# Patient Record
Sex: FEMALE
[De-identification: names, ages, dates, MRNs, and addresses within clinical notes are randomized per-mention and may not be internally consistent; named-entity substitution may affect disease eponyms.]

---

## 2019-03-23 ENCOUNTER — HOSPITAL ENCOUNTER (OUTPATIENT)
Dept: HOSPITAL 5 - TRG | Age: 29
Discharge: HOME | End: 2019-03-23
Attending: OBSTETRICS & GYNECOLOGY
Payer: MEDICAID

## 2019-03-23 VITALS — SYSTOLIC BLOOD PRESSURE: 106 MMHG | DIASTOLIC BLOOD PRESSURE: 61 MMHG

## 2019-03-23 DIAGNOSIS — O99.513: ICD-10-CM

## 2019-03-23 DIAGNOSIS — O47.03: Primary | ICD-10-CM

## 2019-03-23 DIAGNOSIS — Z3A.34: ICD-10-CM

## 2019-03-23 LAB
BACTERIA #/AREA URNS HPF: (no result) /HPF
BILIRUB UR QL STRIP: (no result)
BLOOD UR QL VISUAL: (no result)
MUCOUS THREADS #/AREA URNS HPF: (no result) /HPF
PH UR STRIP: 6 [PH] (ref 5–7)
RBC #/AREA URNS HPF: 2 /HPF (ref 0–6)
UROBILINOGEN UR-MCNC: 2 MG/DL (ref ?–2)
WBC #/AREA URNS HPF: 3 /HPF (ref 0–6)

## 2019-03-23 PROCEDURE — 76815 OB US LIMITED FETUS(S): CPT

## 2019-03-23 PROCEDURE — 81001 URINALYSIS AUTO W/SCOPE: CPT

## 2019-03-23 PROCEDURE — 59025 FETAL NON-STRESS TEST: CPT

## 2019-03-23 NOTE — PROGRESS NOTE
Assessment and Plan


A: Pregnancy at 34 weeks, 3 days gestation.


Not in labor. 


Normal MONIQUE.


Low back pain resolved. 


P: Discussed test results with patient. Discussed signs of labor, daily fetal 

movement counting, warning signs of late pregnancy, comfort measures discussed 

with patient. Rx Augmentin to [facility] on [address]. Advised 

patient to follow up at [facility] this week. Patient voiced 

understanding of instructions. 








Subjective





- Subjective


Date of service: 19


Principal diagnosis: Pregnancy at 34 weeks, 3 days gestation; low back pain


Interval history: 


28 year old  presents to L&D triage at 34 weeks, 3 days gestation 

complaining of lower back discomfort. 


Patient denies falls or trauma; she denies flank pain, fever, chills, malaise, 

or urinary symptoms. 


Patient denies contractions, vaginal bleeding, leaking of fluid. Patient reports

active fetal movement. 








Patient reports: fetal movement normal, no loss of fluid, no vaginal bleeding, 

no contractions





Objective





- Vital Signs


Vital Signs: 


                               Vital Signs - 12hr











  19





  14:49


 


Pulse Rate 93 H


 


Blood Pressure 106/61














- Exam


Abdomen: Present: normal appearance, soft.  Absent: distention, tenderness, 

guarding, rigidity


Uterus: Present: normal, fundal height above umbilicus


FHR: category 1


Uterine Contraction Monitor Mode: External


Uterine Contraction Pattern: Absent


Extremities: normal





- Labs


Labs: 


                                  Abnormal Labs











  19





  15:15


 


U Epithel Cells (Auto)  57.0 H








                         Laboratory Results - last 24 hr











  19





  15:15


 


Urine Color  Ally


 


Urine Turbidity  Cloudy


 


Urine pH  6.0


 


Ur Specific Gravity  1.024


 


Urine Protein  30 mg/dl


 


Urine Glucose (UA)  Neg


 


Urine Ketones  Neg


 


Urine Blood  Neg


 


Urine Nitrite  Neg


 


Urine Bilirubin  Neg


 


Urine Urobilinogen  2.0


 


Ur Leukocyte Esterase  Sm


 


Urine WBC (Auto)  3.0


 


Urine RBC (Auto)  2.0


 


U Epithel Cells (Auto)  57.0 H


 


Urine Bacteria (Auto)  2+


 


Urine Mucus  Few

## 2019-03-23 NOTE — ULTRASOUND REPORT
PROCEDURE: US OB LIMITED 

 

TECHNIQUE:  Transabdominal abdominal sonographic imaging was obtained of the abdomen for amniotic flu
id index. 

 

HISTORY: Amniotic fluid index. 

 

COMPARISONS: Ultrasound dated December 19, 2018. 

 

FINDINGS: 

Single, viable intrauterine pregnancy is present. 

Cephalic presentation. 

Amniotic fluid index estimated at 15.3 cm (within normal range; 7-24 cm). 

Fetal heart rate: 141 bpm. 

Estimated age 34 week 3 day. 

Estimated date of delivery: 5/1/2019. 

 

IMPRESSION:  

Limited examination to evaluate amniotic fluid index which is within normal limits, estimated at 15.3
 cm (normal range 7-24 cm). 

 

Single, viable intrauterine pregnancy with a fetal heart rate of 1 41 bpm. 

 

This document is electronically signed by Donn Bull DO., March 23 2019 06:11:14 PM ET

## 2019-04-25 ENCOUNTER — HOSPITAL ENCOUNTER (INPATIENT)
Dept: HOSPITAL 5 - APU | Age: 29
LOS: 3 days | Discharge: HOME | End: 2019-04-28
Attending: OBSTETRICS & GYNECOLOGY | Admitting: OBSTETRICS & GYNECOLOGY
Payer: MEDICAID

## 2019-04-25 DIAGNOSIS — Z79.899: ICD-10-CM

## 2019-04-25 DIAGNOSIS — D50.0: ICD-10-CM

## 2019-04-25 DIAGNOSIS — O34.211: Primary | ICD-10-CM

## 2019-04-25 DIAGNOSIS — E66.9: ICD-10-CM

## 2019-04-25 DIAGNOSIS — F32.9: ICD-10-CM

## 2019-04-25 DIAGNOSIS — Z3A.39: ICD-10-CM

## 2019-04-25 DIAGNOSIS — J45.909: ICD-10-CM

## 2019-04-25 DIAGNOSIS — D62: ICD-10-CM

## 2019-04-25 DIAGNOSIS — D64.9: ICD-10-CM

## 2019-04-25 LAB
BASOPHILS # (AUTO): 0.1 K/MM3 (ref 0–0.1)
BASOPHILS NFR BLD AUTO: 1.2 % (ref 0–1.8)
EOSINOPHIL # BLD AUTO: 0 K/MM3 (ref 0–0.4)
EOSINOPHIL NFR BLD AUTO: 0.5 % (ref 0–4.3)
HCT VFR BLD CALC: 29.2 % (ref 30.3–42.9)
HGB BLD-MCNC: 9.7 GM/DL (ref 10.1–14.3)
LYMPHOCYTES # BLD AUTO: 1.5 K/MM3 (ref 1.2–5.4)
LYMPHOCYTES NFR BLD AUTO: 21.4 % (ref 13.4–35)
MCHC RBC AUTO-ENTMCNC: 33 % (ref 30–34)
MCV RBC AUTO: 73 FL (ref 79–97)
MONOCYTES # (AUTO): 0.5 K/MM3 (ref 0–0.8)
MONOCYTES % (AUTO): 6.8 % (ref 0–7.3)
PLATELET # BLD: 202 K/MM3 (ref 140–440)
RBC # BLD AUTO: 4.02 M/MM3 (ref 3.65–5.03)

## 2019-04-25 PROCEDURE — 86592 SYPHILIS TEST NON-TREP QUAL: CPT

## 2019-04-25 PROCEDURE — 86901 BLOOD TYPING SEROLOGIC RH(D): CPT

## 2019-04-25 PROCEDURE — 90471 IMMUNIZATION ADMIN: CPT

## 2019-04-25 PROCEDURE — 86762 RUBELLA ANTIBODY: CPT

## 2019-04-25 PROCEDURE — A6250 SKIN SEAL PROTECT MOISTURIZR: HCPCS

## 2019-04-25 PROCEDURE — 86900 BLOOD TYPING SEROLOGIC ABO: CPT

## 2019-04-25 PROCEDURE — 85018 HEMOGLOBIN: CPT

## 2019-04-25 PROCEDURE — 85014 HEMATOCRIT: CPT

## 2019-04-25 PROCEDURE — 90715 TDAP VACCINE 7 YRS/> IM: CPT

## 2019-04-25 PROCEDURE — 85025 COMPLETE CBC W/AUTO DIFF WBC: CPT

## 2019-04-25 PROCEDURE — 87806 HIV AG W/HIV1&2 ANTB W/OPTIC: CPT

## 2019-04-25 PROCEDURE — 36415 COLL VENOUS BLD VENIPUNCTURE: CPT

## 2019-04-25 PROCEDURE — 86850 RBC ANTIBODY SCREEN: CPT

## 2019-04-25 PROCEDURE — 86803 HEPATITIS C AB TEST: CPT

## 2019-04-25 PROCEDURE — 86706 HEP B SURFACE ANTIBODY: CPT

## 2019-04-25 RX ADMIN — OXYCODONE AND ACETAMINOPHEN PRN TAB: 5; 325 TABLET ORAL at 19:23

## 2019-04-25 RX ADMIN — HYDROMORPHONE HYDROCHLORIDE PRN MG: 1 INJECTION, SOLUTION INTRAMUSCULAR; INTRAVENOUS; SUBCUTANEOUS at 14:19

## 2019-04-25 RX ADMIN — HYDROMORPHONE HYDROCHLORIDE PRN MG: 1 INJECTION, SOLUTION INTRAMUSCULAR; INTRAVENOUS; SUBCUTANEOUS at 14:45

## 2019-04-25 RX ADMIN — KETOROLAC TROMETHAMINE PRN MG: 30 INJECTION, SOLUTION INTRAMUSCULAR at 15:07

## 2019-04-25 RX ADMIN — HYDROMORPHONE HYDROCHLORIDE PRN MG: 1 INJECTION, SOLUTION INTRAMUSCULAR; INTRAVENOUS; SUBCUTANEOUS at 13:58

## 2019-04-25 RX ADMIN — HYDROMORPHONE HYDROCHLORIDE PRN MG: 1 INJECTION, SOLUTION INTRAMUSCULAR; INTRAVENOUS; SUBCUTANEOUS at 14:50

## 2019-04-25 NOTE — OPERATIVE REPORT
Operative Report


Operative Report: 


Preoperative diagnosis


1. SIUP at 39 weeks and 1 day gestation not in labor.


2. Previous C/section. 





Postoperative diagnosis: Same.





Procedure: Repeat low-transverse  section.





Surgeon: Dr. Perez





Assistant: none





Anesthesia: epidural.





IVF: RL 1500 cc 





EBL: 800 cc





Urine: 200 cc clear





Complications: none.





Intraoperative findings:


1.  A male infant found in an RL position, nuchal cord x 1, delivered at 12:36 

PM, Apgars 8 at 1 minute and 9 at 5 minutes, weight 7 lbs. 4 oz.


2.  Normal fallopian tubes and ovaries bilaterally.





Procedure details:


Risks, benefits, and alternatives of the procedure were discussed in detail with

the patient which included but not limited to the risk of infection, hemorrhage 

requiring blood transfusion, injury to the bowel or bladder and blood vessels.  

The patient expressed understanding, her questions were answered, and she gave 

informed consent.





The patient was taken to the operating room with an IV fluid infusing Ringers 

lactate.  In the operating room, she was placed in a sitting position and given 

spinal anesthesia. She was then placed in a dorsal supine position with a 

leftward tilt.  Muniz catheter in Venodyne boots were placed.  The abdomen was 

washed and she was prepared and draped in usual sterile fashion.  After 

confirming adequate anesthesia, the Pfannenstiel skin incision was made in the 

lower abdomen about 2 cm above the pubic symphysis using the scalpel.  This 

incision was carried down to the underlying fascia using the Bovie.  The fascia 

was opened bilaterally in a curvilinear fashion using the Bovie.  2 straight 

Kocker clamps were used to grasp the upper edge of the fascia from which the 

underlying rectus abdominis muscles was dissected off using the Bovie.  A 

similar procedure was done with the lower edge of the fascia to dissect the 

underlying rectus abdominis muscle.  The muscle was  bluntly from the 

midline by pulling.  The parietal peritoneum was grasped with 2 hemostat clamps 

and entered sharply using Metzenbaum scissors.  A quick survey of the anatomy 

revealed a gravid uterus, normal fallopian tubes and ovaries bilaterally.  A 

bladder flap was created.  Doni'O retractor was placed in the incision for 

proper visualization.  A low transverse incision was made in the lower uterine 

segment using the scalpel and extended bilaterally in a curvilinear fashion 

using bandage scissors.  There was copious amount of clear amniotic fluids.  The

infant was found in an RL position, the head was delivered atraumatically 

followed by the delivery of the shoulders and the rest of the body at 12:36 PM. 

There was nuchal cord x 1. The cord was clamped 2 and cut and the infant was 

handed off to the waiting pediatrician.  The infant was a male, Apgars were 8 at

1 minute and 9 at 5 minutes, weight was 7 pounds and 4 ounces.  Cord blood was 

collected.  The placenta was delivered manually and it was complete with a 

three-vessel cord.  The uterine cavity was cleaned of clots and debris using dry

lap sponges.  The uterine incision was repaired in a running locked fashion 

using 0 Vicryl sutures.  A second layer of imbrication was placed. The gutters 

were cleaned of clots and debris using dry lap sponges.  After confirming 

adequate hemostasis, the instruments were removed from the abdominal cavity.  Th

e rectus muscle was reapproximated in an interrupted fashion using 0 Vicryl 

sutures.  The fascia was closed in a running fashion using 0 Vicryl sutures. The

subcutaneous adipose layer was closed with 2.0 chromic sutures.   The skin was 

closed with staples.  Sterile dressing was placed.





The counts of laps, needles, sponges, and instruments were correct 2.  The 

patient tolerated the procedure well, she was taken to the recovery room in a 

stable condition.

## 2019-04-25 NOTE — HISTORY AND PHYSICAL REPORT
History of Present Illness


Date of examination: 19


Date of admission: 


19 09:09





Chief complaint: 





SIUP at 39 weeks and 1 day gestation not in labor.


Previous C/section.


History of present illness: 





Patient is a 29 year old , LMP 07/15/18, EDC 19 at 39 weeks and 1 

day gestation who was admitted for elective repeat C/section. She denies any con

tractions, fluid leakage or bleeding. She reports good fetal movement. fetal 

tracing CAT1.





Past History


Past Medical History: asthma, other (vit D def, Arnold Chiari malformation.)


Past Surgical History:  section (Neck surgery (vertebral))


GYN History: chlamydia, herpes


Family/Genetic History: none


Social history: no significant social history





- Obstetrical History


Expected Date of Delivery: 19


Actual Gestation: 39 Week(s) 1 Day(s) 


: 7


Para: 6


Number of Living Children: 6





Medications and Allergies


                                    Allergies











Allergy/AdvReac Type Severity Reaction Status Date / Time


 


nickel Allergy  Rash Verified 18 12:44











                                Home Medications











 Medication  Instructions  Recorded  Confirmed  Last Taken  Type


 


Pnv95/Ferrous Fumarate/FA 1 each PO DAILY 10/14/13 09/04/16 06/28/15 History





[Prenatal Multivitamins Tablet]     


 


Ferrous Sulfate [Feosol 325 MG tab] 325 mg PO BID #60 tablet 16  Unknown 

Rx


 


HYDROcodone/APAP 5-325 [[place] 1 each PO Q6HR PRN #30 tablet 16  Unknown Rx





5/325]     


 


Ibuprofen [Motrin] 800 mg PO Q8HR PRN #30 tablet 16  Unknown Rx


 


Prenatal Vit Calc,Iron,Folic 1 each PO DAILY 30 Days  tablet 16  Unknown 

Rx





[Prenatal Vitamins]     


 


Sertraline [Zoloft] 50 mg PO QDAY 16 Unknown History














- Vital Signs


Vital signs: 


                                   Vital Signs











Temp Resp


 


 97.9 F   18 


 


 19 09:37  19 09:37








                                        











Temp Pulse Resp BP Pulse Ox


 


 97.9 F      18       


 


 19 09:37     19 09:37      














- Physical Exam


Cardiovascular: Normal S1, Normal S2


Lungs: Positive: Clear to auscultation


Vulva: both: normal


Adnexa: both: normal


Deep Tendon Reflex Grade: Normal +2





- Obstetrical


FHR: category 1


Cervical Dilatation: 0


Cervical Effacement Percentage: 0


Fetal station: -3


Uterine Contraction Pattern: Absent





Results


Result Diagrams: 


                                 19 09:56





All other labs normal.








Assessment and Plan





- Patient Problems


(1) 39 weeks gestation of pregnancy


Current Visit: Yes   Status: Acute   





(2) Previous  section


Current Visit: Yes   Status: Acute   


Plan to address problem: 


Admit to labor floor.


Routine preop labs.


IV hydration.


Keep NPO.


Fetal monitoring.


Patient was counselled for repeat C/section.


Risks, benefits, and alternatives of the procedure were discussed in detail with

 the patient which included but not limited to the risk of infection, hemorrhage

 requiring blood transfusion, injury to the bowel or bladder and blood vessels. 

 The patient expressed understanding, her questions were answered, and she gave 

informed consent.


Anesthesia notified.








(3) Anemia


Current Visit: Yes   Status: Acute   


Qualifiers: 


   Anemia type: iron deficiency 





(4) Obesity


Current Visit: Yes   Status: Acute   





(5) Grand multipara


Current Visit: Yes   Status: Acute

## 2019-04-25 NOTE — ANESTHESIA DAY OF SURGERY
Anesthesia Day of Surgery





- Day of Surgery


Patient Examined: Yes


Patient H&P Reviewed: Yes


Patient is NPO: Yes


Beta Blockers: No


Cardiac Clearance: No


Pulmonary Clearance: No


Dany's Test: N/A

## 2019-04-25 NOTE — ANESTHESIA CONSULTATION
Anesthesia Consult and Med Hx





- Airway


Anesthetic Teeth Evaluation: Poor (crowding)


ROM Head & Neck: Adequate


Mental/Hyoid Distance: Adequate


Mallampati Class: Class II


Intubation Access Assessment: Probably Good





- Pulmonary Exam


CTA: Yes





- Cardiac Exam


Cardiac Exam: RRR





- Pre-Operative Health Status


ASA Pre-Surgery Classification: ASA2


Proposed Anesthetic Plan: Epidural, Spinal





- Pulmonary


Hx Smoking: No


Hx Asthma: Yes (diagnosed when pt was child)


COPD: No


Hx Pneumonia: No





- Cardiovascular System


Hx Hypertension: No


Hx Heart Attack/AMI: No





- Central Nervous System


Hx Seizures: No


Hx Psychiatric Problems: Yes (depression)





- Endocrine


Hx Renal Disease: No


Hx End Stage Renal Disease: No


Hx Hypothyroidism: No


Hx Hyperthyroidism: No





- Hematic


Hx Anemia: No


Hx Sickle Cell Disease: No





- Other Systems


Hx Alcohol Use: No


Hx Cancer: No


Hx Obesity: Yes

## 2019-04-26 LAB
HCT VFR BLD CALC: 24 % (ref 30.3–42.9)
HGB BLD-MCNC: 7.7 GM/DL (ref 10.1–14.3)

## 2019-04-26 RX ADMIN — IBUPROFEN PRN MG: 800 TABLET, FILM COATED ORAL at 19:11

## 2019-04-26 RX ADMIN — Medication SCH EACH: at 19:28

## 2019-04-26 RX ADMIN — FERROUS SULFATE TAB 325 MG (65 MG ELEMENTAL FE) SCH MG: 325 (65 FE) TAB at 19:28

## 2019-04-26 RX ADMIN — SIMETHICONE PRN MG: 80 TABLET, CHEWABLE ORAL at 20:17

## 2019-04-26 RX ADMIN — KETOROLAC TROMETHAMINE PRN MG: 30 INJECTION, SOLUTION INTRAMUSCULAR at 00:40

## 2019-04-26 RX ADMIN — OXYCODONE AND ACETAMINOPHEN PRN TAB: 5; 325 TABLET ORAL at 19:09

## 2019-04-26 RX ADMIN — KETOROLAC TROMETHAMINE PRN MG: 30 INJECTION, SOLUTION INTRAMUSCULAR at 08:44

## 2019-04-27 RX ADMIN — IBUPROFEN PRN MG: 800 TABLET, FILM COATED ORAL at 15:56

## 2019-04-27 RX ADMIN — FERROUS SULFATE TAB 325 MG (65 MG ELEMENTAL FE) SCH MG: 325 (65 FE) TAB at 16:03

## 2019-04-27 RX ADMIN — IBUPROFEN PRN MG: 800 TABLET, FILM COATED ORAL at 01:14

## 2019-04-27 RX ADMIN — SIMETHICONE PRN MG: 80 TABLET, CHEWABLE ORAL at 16:03

## 2019-04-27 RX ADMIN — OXYCODONE AND ACETAMINOPHEN PRN TAB: 5; 325 TABLET ORAL at 01:12

## 2019-04-27 RX ADMIN — OXYCODONE AND ACETAMINOPHEN PRN TAB: 5; 325 TABLET ORAL at 15:57

## 2019-04-27 RX ADMIN — Medication SCH EACH: at 16:03

## 2019-04-27 NOTE — PROGRESS NOTE
Assessment and Plan


A: Postpartum day 2 S/P repeat low transverse  section. 


Anemia secondary to pregnancy and blood loss. 


P: Continue oral iron supplementation. 


Encouraged ambulation. 


Anticipate discharge tomorrow. 








Subjective





- Subjective


Date of service: 19


Principal diagnosis: POD#2 s/p repeat c/s


Interval history: 


Postpartum/postop day 1 S/P repeat low transverse  section. Patient is 

doing well.


Passing gas, tolerating a regular diet without nausea or vomiting, ambulating 

well, voiding without difficulty. Patient reports a small amount of lochia. 


Patient denies headache, dizziness, chest pain, cough, shortness of breath, leg 

pain, or heavy vaginal bleeding. 





Patient reports: appetite normal, voiding normally, pain well controlled, 

flatus, ambulating normally, no dizzy ambulation, no nauseated


: doing well





Objective





- Vital Signs


Latest vital signs: 


                                   Vital Signs











  Temp Pulse Resp BP BP Pulse Ox


 


 19 07:14  97.8 F  75  18   103/55 


 


 19 01:14    16   


 


 19 01:12    16   


 


 19 00:45  98.3 F  76  16   107/59  96


 


 19 19:11    18   


 


 19 19:09    18   


 


 19 15:35  97.4 F L  86  17  114/77   94








                                Intake and Output











 19





 23:59 07:59 15:59


 


Intake Total 360 720 


 


Balance 360 720 


 


Intake:   


 


  Oral  480 


 


  Intake, Free Water 360 240 


 


Other:   


 


  Total, Intake Amount  480 


 


  # Voids   


 


    Void 1 1 














- Exam


Cardiovascular: Present: Regular rate, Normal S1, Normal S2


Lungs: Present: Clear to auscultation


Abdomen: Present: normal appearance, soft, normal bowel sounds.  Absent: 

distention, tenderness, guarding, rigidity


Uterus: Present: normal, firm, fundal height below umbilicus.  Absent: 

bogginess, tenderness


Extremities: Present: normal.  Absent: tenderness


Incision: Present: normal, dry, intact

## 2019-04-28 VITALS — SYSTOLIC BLOOD PRESSURE: 112 MMHG | DIASTOLIC BLOOD PRESSURE: 62 MMHG

## 2019-04-28 RX ADMIN — IBUPROFEN PRN MG: 800 TABLET, FILM COATED ORAL at 11:13

## 2019-04-28 RX ADMIN — OXYCODONE AND ACETAMINOPHEN PRN TAB: 5; 325 TABLET ORAL at 11:24

## 2019-04-28 RX ADMIN — FERROUS SULFATE TAB 325 MG (65 MG ELEMENTAL FE) SCH MG: 325 (65 FE) TAB at 11:04

## 2019-04-28 RX ADMIN — SIMETHICONE PRN MG: 80 TABLET, CHEWABLE ORAL at 11:04

## 2019-04-28 RX ADMIN — OXYCODONE AND ACETAMINOPHEN PRN TAB: 5; 325 TABLET ORAL at 04:04

## 2019-04-28 RX ADMIN — IBUPROFEN PRN MG: 800 TABLET, FILM COATED ORAL at 04:04

## 2019-04-28 RX ADMIN — Medication SCH EACH: at 11:04

## 2019-04-28 NOTE — DISCHARGE SUMMARY
Providers





- Providers


Date of Admission: 


19 09:09





Date of discharge: 19


Attending physician: 


JILL PARKER MD





None


Primary care physician: 


JILL PARKER MD








Hospitalization


Reason for admission:  section


Delivery: 


Procedure: repeat low transverse


Episiotomy: none


Laceration: none


Incision: normal, dry, intact


Other postpartum procedures: none


Postpartum complications: none


Discharge diagnosis: IUP at term delivered


 baby: male


Pertinent studies: 


Labs





Hospital course: 


Normal hospital course





Condition at discharge: Good


Disposition: DC-01 TO HOME OR SELFCARE





- Discharge Diagnoses


(1) Term pregnancy delivered


Status: Acute   





(2) Anemia due to blood loss


Status: Acute   





Plan





- Provider Discharge Summary


Activity: routine, no sex for 6 weeks, no heavy lifting 4 weeks, no strenuous 

exercise


Diet: routine


Instructions: routine


Additional instructions: 


Continue taking your prenatal vitamins and iron supplements at home. 





Call your doctor immediately for:


* Fever > 100.5


* Heavy vaginal bleeding ( >1 pad per hour)


* Severe persistent headache


* Shortness of breath


* Reddened, hot, painful area to leg or breast


* Drainage or odor from incision.





* Keep incision clean and dry at all times and follow doctor's instructions 

regarding bathing/showering











- Follow up plan


Follow up: 


JILL PARKER MD [Primary Care Provider] - 7 Days

## 2019-04-28 NOTE — PROGRESS NOTE
Assessment and Plan


A: Postpartum/postop day 3 S/P repeat low transverse  section. 


Anemia secondary to pregnancy and blood loss.


P: Discharge patient home today. Patient is to continue taking her prenatal 

vitamin and iron supplements at home. Postpartum discharge instructions and 

warning signs discussed with patient. Advised patient re: care of incision and 

activity restrictions. Advised patient to avoid intercourse, driving, stair 

climbing, lifting and heavy housework, tub baths (patient may take showers). 

Advised patient to follow up at [facility] in 1 week for incision check. 

Advised pt. she is to call the office tomorrow when they open to schedule that 

appointment. Patient voiced understanding of all instructions. 








Subjective





- Subjective


Date of service: 19


Principal diagnosis: POD#3 s/p repeat c/s


Interval history: 


Postpartum/postop day 3 S/P repeat low transverse  section. Patient is 

doing well.


Passing gas, tolerating a regular diet without nausea or vomiting, ambulating 

well, voiding without difficulty. Patient reports a small amount of lochia. 


Patient denies headache, dizziness, chest pain, cough, shortness of breath, leg 

pain, or heavy vaginal bleeding. Patient desires discharge today. 





Patient reports: appetite normal, voiding normally, pain well controlled, 

flatus, ambulating normally, no dizzy ambulation, no nauseated


: doing well





Objective





- Vital Signs


Latest vital signs: 


                                   Vital Signs











  Temp Pulse Resp BP Pulse Ox


 


 19 08:13  98.2 F  75  20  113/57  95


 


 19 23:45  98.0 F  81  18  108/45  94


 


 19 16:57    16  


 


 19 16:56    16  


 


 19 16:36  98.5 F  79  20  126/63  97


 


 19 15:57    18  


 


 19 15:56    18  








                                Intake and Output











 19





 23:59 07:59 15:59


 


Intake Total  500 


 


Balance  500 


 


Intake:   


 


  Oral  200 


 


  Intake, Free Water  300 


 


Other:   


 


  Total, Intake Amount  200 














- Exam


Cardiovascular: Present: Regular rate, Normal S1, Normal S2


Lungs: Present: Clear to auscultation


Abdomen: Present: normal appearance, soft, normal bowel sounds.  Absent: 

distention, tenderness, guarding, rigidity


Uterus: Present: normal, firm, fundal height below umbilicus.  Absent: 

bogginess, tenderness


Extremities: Present: normal, edema (mild pedal edema bilaterally).  Absent: 

tenderness


Incision: Present: normal, dry, intact

## 2020-01-03 ENCOUNTER — HOSPITAL ENCOUNTER (EMERGENCY)
Dept: HOSPITAL 5 - ED | Age: 30
Discharge: LEFT BEFORE DISCHARGE | End: 2020-01-03
Payer: MEDICAID

## 2020-01-03 VITALS — DIASTOLIC BLOOD PRESSURE: 65 MMHG | SYSTOLIC BLOOD PRESSURE: 105 MMHG

## 2020-01-03 DIAGNOSIS — J02.9: Primary | ICD-10-CM

## 2020-01-03 DIAGNOSIS — R05: ICD-10-CM

## 2020-01-03 DIAGNOSIS — J34.89: ICD-10-CM

## 2020-01-03 PROCEDURE — 99282 EMERGENCY DEPT VISIT SF MDM: CPT

## 2020-01-03 NOTE — EMERGENCY DEPARTMENT REPORT
Chief Complaint: Upper Respiratory Infection


Stated Complaint: COLD SX/SORE THROAT


Time Seen by Provider: 01/03/20 11:12





- HPI


History of Present Illness: 





29-year-old  female comes in presenting with upper respiratory 

congestion cough sore throat and body aches 3 days.  Patient reports she is 

currently on antibiotics of amoxicillin for tooth ache.  Patient reports she's 

been taking ibuprofen last dose 10 minutes ago.  Patient also complains of a 

runny nose and nasal congestion.





- Exam


Vital Signs: 


                                   Vital Signs











  01/03/20





  09:03


 


Temperature 98.6 F


 


Pulse Rate 73


 


Respiratory 22





Rate 


 


Blood Pressure 137/62


 


O2 Sat by Pulse 96





Oximetry 











Physical Exam: 





Alert and oriented 3 nontoxic in appearance no acute distress.


Mouth oral mucosa is moist tonsillar is nonerythematous no exudate


Neck supple no tonsillar hypertrophy or tenderness.


Chest clear to auscultation lateral


Neck regular rate and rhythm no murmurs appreciated


Neuro alert and oriented ambulate without difficulties


MSE screening note: 


Focused history and physical exam performed.


Due to findings the following was ordered:





29-year-old  female comes in presenting with upper respiratory 

congestion cough sore throat and body aches 3 days.  Patient reports she is 

currently on antibiotics of amoxicillin for tooth ache.  Patient reports she's 

been taking ibuprofen last dose 10 minutes ago.  Patient also complains of a 

runny nose and nasal congestion.














Discussed with patient this is a viral syndrome that she needs to continue with 

supportive care completes her antibiotics for her tooth infection continue with 

ibuprofen/acetaminophen and over-the-counter cough medication.  She can follow-

up with urgent care or her primary care doctor.





ED Disposition for [facility]


Disposition: Z-07 MED SCREENING EXAM-LEFT


Is pt being admited?: No


Does the pt Need Aspirin: No


Condition: Stable


Additional Instructions: 


Follow-up urgent care where primary care provider.  Increase her fluids advance 

her diet as tolerated take Tylenol or ibuprofen for pain management.  Over the 

counter cough medication such as Robitussin or Mucinex.


Referrals: 


[facility], [facility] [Primary Care Provider] - 3-5 Days

## 2022-02-12 ENCOUNTER — HOSPITAL ENCOUNTER (EMERGENCY)
Dept: HOSPITAL 5 - ED | Age: 32
Discharge: HOME | End: 2022-02-12
Payer: SELF-PAY

## 2022-02-12 VITALS — DIASTOLIC BLOOD PRESSURE: 63 MMHG | SYSTOLIC BLOOD PRESSURE: 110 MMHG

## 2022-02-12 DIAGNOSIS — Z3A.01: ICD-10-CM

## 2022-02-12 DIAGNOSIS — Z88.8: ICD-10-CM

## 2022-02-12 DIAGNOSIS — J45.909: ICD-10-CM

## 2022-02-12 DIAGNOSIS — Z79.899: ICD-10-CM

## 2022-02-12 DIAGNOSIS — F17.200: ICD-10-CM

## 2022-02-12 DIAGNOSIS — O20.9: Primary | ICD-10-CM

## 2022-02-12 LAB
BACTERIA #/AREA URNS HPF: (no result) /HPF
BASOPHILS # (AUTO): 0 K/MM3 (ref 0–0.1)
BASOPHILS NFR BLD AUTO: 0.6 % (ref 0–1.8)
BILIRUB UR QL STRIP: (no result)
BLOOD UR QL VISUAL: (no result)
EOSINOPHIL # BLD AUTO: 0.1 K/MM3 (ref 0–0.4)
EOSINOPHIL NFR BLD AUTO: 0.8 % (ref 0–4.3)
HCT VFR BLD CALC: 40.2 % (ref 30.3–42.9)
HGB BLD-MCNC: 13.2 GM/DL (ref 10.1–14.3)
LYMPHOCYTES # BLD AUTO: 2.3 K/MM3 (ref 1.2–5.4)
LYMPHOCYTES NFR BLD AUTO: 29.7 % (ref 13.4–35)
MCHC RBC AUTO-ENTMCNC: 33 % (ref 30–34)
MCV RBC AUTO: 80 FL (ref 79–97)
MONOCYTES # (AUTO): 0.6 K/MM3 (ref 0–0.8)
MONOCYTES % (AUTO): 7.5 % (ref 0–7.3)
MUCOUS THREADS #/AREA URNS HPF: (no result) /HPF
PH UR STRIP: 8 [PH] (ref 5–7)
PLATELET # BLD: 337 K/MM3 (ref 140–440)
PROT UR STRIP-MCNC: (no result) MG/DL
RBC # BLD AUTO: 5.04 M/MM3 (ref 3.65–5.03)
RBC #/AREA URNS HPF: 5 /HPF (ref 0–6)
UROBILINOGEN UR-MCNC: < 2 MG/DL (ref ?–2)
WBC #/AREA URNS HPF: 3 /HPF (ref 0–6)

## 2022-02-12 PROCEDURE — 76817 TRANSVAGINAL US OBSTETRIC: CPT

## 2022-02-12 PROCEDURE — 85025 COMPLETE CBC W/AUTO DIFF WBC: CPT

## 2022-02-12 PROCEDURE — 84702 CHORIONIC GONADOTROPIN TEST: CPT

## 2022-02-12 PROCEDURE — 81001 URINALYSIS AUTO W/SCOPE: CPT

## 2022-02-12 PROCEDURE — 76801 OB US < 14 WKS SINGLE FETUS: CPT

## 2022-02-12 PROCEDURE — 99284 EMERGENCY DEPT VISIT MOD MDM: CPT

## 2022-02-12 PROCEDURE — 36415 COLL VENOUS BLD VENIPUNCTURE: CPT

## 2022-02-12 NOTE — ULTRASOUND REPORT
ULTRASOUND OBSTETRIC 



INDICATION / CLINICAL INFORMATION:

vaginal bleeding.



TECHNIQUE:

Transvaginal.



COMPARISON:

None available.



FINDINGS:

GESTATIONAL SAC: Tiny 6 mm intrauterine gestational sac

YOLK SAC: No significant abnormality.



EMBRYO/FETUS: Not visualized 



ADNEXA: No significant abnormality.

FREE FLUID: None.



ADDITIONAL FINDINGS: None.



IMPRESSION:

1. Early IUP with intrauterine gestational sac and yolk sac. Follow-up beta-hCG and ultrasound is rec
ommended to confirm fetal viability.



Signer Name: Alpohnse Pérez MD 

Signed: 2/12/2022 4:23 PM

Workstation Name: [id number]

## 2022-02-12 NOTE — ULTRASOUND REPORT
ULTRASOUND OBSTETRIC 



INDICATION / CLINICAL INFORMATION:

vaginal bleeding.



TECHNIQUE:

Transabdominal.



COMPARISON:

None available.



FINDINGS:

GESTATIONAL SAC: Possible tiny 5-7 mm intrauterine gestational sac. 

YOLK SAC: Not visualized



EMBRYO/FETUS: Not visualized 



ADNEXA: Not visualized secondary to bowel gas

FREE FLUID: None.



ADDITIONAL FINDINGS: None.



IMPRESSION:

1. Possible early IUP with tiny intrauterine gestational sac. Follow-up beta-hCG and ultrasound bari ellington.



Signer Name: Alphonse Pérez MD 

Signed: 2/12/2022 4:21 PM

Workstation Name: [id number]

## 2022-02-12 NOTE — EMERGENCY DEPARTMENT REPORT
ED Female  HPI





- General


Chief complaint: Vaginal Bleeding


Stated complaint: 5WKS PREGNANT/BLEEDING


Source: patient


Mode of arrival: Ambulatory


Limitations: No Limitations





- History of Present Illness


Initial comments: 


31-year-old female presents to the ED for vaginal spotting after having vaginal 

intercourse on yesterday.  States that she do not have any vaginal spotting toda

y but was concerned if she should have vaginal intercourse.  She was evaluated 

at [facility] on  for similar complaint.  Patient 

had lab work  and had HCG level of 204.6 and was told to follow back up 2-4 days

.Patient did not follow-up state that she did not have the money to drive back 

to [place].  Read Information through  patient visit summary through  

patient my chart.Patient  8Para 7.  Patient has RhoGRam Positve and O 

positive document .  Patient states that she has no OB follow-up at present due 

to no insurance.  Patient is alert and oriented x4.  No acute distress noted. no

ill appearance noted.





Last Menstrual Period: 22


EDC: 10/12/22


Associated Symptoms: denies other symptoms.  denies: vaginal discharge, vaginal 

bleeding, abdominal pain, nausea/vomiting, headaches, loss of appetite





- Related Data


: 8


Para: 7


                                Home Medications











 Medication  Instructions  Recorded  Confirmed  Last Taken


 


Pnv95/Ferrous Fumarate/FA 1 each PO DAILY 10/14/13 04/26/19 04/22/19 09:00





[Prenatal Multivitamins Tablet]    


 


Sertraline [Zoloft] 50 mg PO QDAY 16 Unknown








                                  Previous Rx's











 Medication  Instructions  Recorded  Last Taken  Type


 


Ferrous Sulfate [Feosol 325 MG tab] 325 mg PO BID #60 tablet 16 

09:00 Rx


 


HYDROcodone/APAP 5-325 [[place] 1 each PO Q6HR PRN #30 tablet 16 Unknown Rx





5/325]    


 


Ibuprofen [Motrin] 800 mg PO Q8HR PRN #30 tablet 16 Unknown Rx


 


Prenatal Vit Calc,Iron,Folic 1 each PO DAILY 30 Days  tablet 16 Unknown Rx





[Prenatal Vitamins]    


 


Ibuprofen [Motrin] 800 mg PO Q8HR PRN #30 tablet 19 Unknown Rx


 


oxyCODONE /ACETAMINOPHEN [Percocet 1 tab PO Q4HR #20 tab 19 Unknown Rx





5/325]    











                                    Allergies











Allergy/AdvReac Type Severity Reaction Status Date / Time


 


nickel Allergy  Rash Verified 18 12:44














ED Review of Systems


ROS: 


Stated complaint: 5WKS PREGNANT/BLEEDING


Other details as noted in HPI





Constitutional: denies: chills, fever


Eyes: denies: eye pain, eye discharge, vision change


ENT: denies: ear pain, throat pain


Respiratory: denies: cough, shortness of breath, wheezing


Cardiovascular: denies: chest pain, palpitations


Endocrine: no symptoms reported


Gastrointestinal: denies: abdominal pain, nausea, diarrhea


Genitourinary: denies: urgency, dysuria, discharge


Musculoskeletal: denies: back pain, joint swelling, arthralgia


Skin: denies: rash, lesions


Neurological: denies: headache, weakness, paresthesias


Psychiatric: denies: anxiety, depression


Hematological/Lymphatic: denies: easy bleeding, easy bruising





ED Past Medical Hx





- Past Medical History


Hx Hypertension: No


Hx Heart Attack/AMI: No


Hx Congestive Heart Failure: No


Hx Diabetes: No


Hx Deep Vein Thrombosis: No


Hx Renal Disease: No


Hx Sickle Cell Disease: No


Hx Seizures: No


Hx Asthma: Yes (diagnosed when pt was child)


Hx COPD: No


Hx HIV: No





- Surgical History


Additional Surgical History: neck surgery





- Social History


Smoking Status: Current Every Day Smoker





- Medications


Home Medications: 


                                Home Medications











 Medication  Instructions  Recorded  Confirmed  Last Taken  Type


 


Pnv95/Ferrous Fumarate/FA 1 each PO DAILY 10/14/13 04/26/19 04/22/19 09:00 

History





[Prenatal Multivitamins Tablet]     


 


Ferrous Sulfate [Feosol 325 MG tab] 325 mg PO BID #60 tablet 16 09:00 Rx


 


HYDROcodone/APAP 5-325 [[place] 1 each PO Q6HR PRN #30 tablet 16 

Unknown Rx





5/325]     


 


Ibuprofen [Motrin] 800 mg PO Q8HR PRN #30 tablet 16 Unknown Rx


 


Prenatal Vit Calc,Iron,Folic 1 each PO DAILY 30 Days  tablet 16 

Unknown Rx





[Prenatal Vitamins]     


 


Sertraline [Zoloft] 50 mg PO QDAY 09/04/16 04/25/19 Unknown History


 


Ibuprofen [Motrin] 800 mg PO Q8HR PRN #30 tablet 19  Unknown Rx


 


oxyCODONE /ACETAMINOPHEN [Percocet 1 tab PO Q4HR #20 tab 19  Unknown Rx





5/325]     














ED Physical Exam





- General


Limitations: No Limitations


General appearance: alert, in no apparent distress





- Head


Head exam: Present: atraumatic, normocephalic





- Eye


Eye exam: Present: normal appearance





- ENT


ENT exam: Present: mucous membranes moist





- Neck


Neck exam: Present: normal inspection





- Respiratory


Respiratory exam: Present: normal lung sounds bilaterally.  Absent: respiratory 

distress





- Cardiovascular


Cardiovascular Exam: Present: regular rate, normal rhythm.  Absent: systolic 

murmur, diastolic murmur, rubs, gallop





- GI/Abdominal


GI/Abdominal exam: Present: soft, normal bowel sounds





- Extremities Exam


Extremities exam: Present: normal inspection





- Back Exam


Back exam: Present: normal inspection





- Neurological Exam


Neurological exam: Present: alert, oriented X3





- Psychiatric


Psychiatric exam: Present: normal affect, normal mood





- Skin


Skin exam: Present: warm, dry, intact, normal color.  Absent: rash





ED Course


                                   Vital Signs











  22





  11:42 17:27


 


Temperature 98.8 F 


 


Pulse Rate 66 70


 


Respiratory 20 16





Rate  


 


Blood Pressure  110/63


 


Blood Pressure 117/68 





[Right]  


 


O2 Sat by Pulse 98 99





Oximetry  














ED Medical Decision Making





- Lab Data


Result diagrams: 


                                 22 13:43








- Radiology Data


[facility]  


                                     [address] [place] [address]  


 


                                         Ultrasound Report   


                                               Signed  


 


Patient: CINDY MCKEON                                                 

               MR#:   


[id number]          


: 1990                                                                

Acct:[id number]      


 


Age/Sex: 31 / F                                                                

ADM Date: 22     


 


Loc: ED       


Attending Dr:   


 


 


Ordering Physician: ANGIE JACKSON  


Date of Service: 22  


Procedure(s): US OB transvaginal  


Accession Number(s): [id number]  


 


cc: ANGIE JACKSON   


 


 


ULTRASOUND OBSTETRIC   


 


 INDICATION / CLINICAL INFORMATION:  


 vaginal bleeding.  


 


 TECHNIQUE:  


 Transvaginal.  


 


 COMPARISON:  


 None available.  


 


 FINDINGS:  


 GESTATIONAL SAC: Tiny 6 mm intrauterine gestational sac  


 YOLK SAC: No significant abnormality.  


 


 EMBRYO/FETUS: Not visualized   


 


 ADNEXA: No significant abnormality.  


 FREE FLUID: None.  


 


 ADDITIONAL FINDINGS: None.  


 


 IMPRESSION:  


 1. Early IUP with intrauterine gestational sac and yolk sac. Follow-up beta-hCG

 and ultrasound is 


recommended to confirm fetal viability.  


 


 Signer Name: Alphonse Pérez MD   


 Signed: 2022 4:23 PM  


 Workstation Name: [id number]   


 


 


Transcribed By: TL  


Dictated By: Alphonse Pérez MD  


Electronically Authenticated By: Alphonse Pérez MD    


Signed Date/Time: 22                                


 


 


 


DD/DT: 22                                                            

  


TD/TT:








- Medical Decision Making


31-year-old female presents to the ED for vaginal spotting after having vaginal 

intercourse on yesterday.  States that she do not have any vaginal spotting 

today but was concerned if she should have vaginal intercourse.  She was 

evaluated at [facility] on  for similar complaint.

  Patient had lab work  and had HCG level of 204.6 and was told to follow back 

up 2-4 days .Patient did not follow-up state that she did not have the money to 

drive back to [place].  Read Information through  patient visit summary 

through  patient my chart.Patient  8Para 7.  Patient has RhoGRam Positve 

and O positive document .  Patient states that she has no OB follow-up at 

present due to no insurance.  Patient is alert and oriented x4.  No acute 

distress noted. no ill appearance noted.Patient HCG level is 3824 today.Patient 

is to follow up in 48 hours with My OB/Gyn.





Rechecked the patient is resting quietly quietly and comfortable and feeling 

better. I discussed the results of diagnostic study, my clinical impression and 

the plan for further treatment with the patient. Patient agrees with plan and 

discharge at this present time. All question addressed.





I have given the patient instruction regarding a diagnosis ,expectation 

,follow-up and return precaution. I explained to the patient that emergent 

condition may arise and to return to the ED for new worsen and any new 

persisting condition. I have explained the importance of following up with the 

primary care physician or referral physician listed below has instructed. The 

patient verbalized understanding of discharge instruction.








Critical care attestation.: 


If time is entered above; I have spent that time in minutes in the direct care 

of this critically ill patient, excluding procedure time.








ED Disposition


Clinical Impression: 


 Vaginal bleeding affecting early pregnancy





Disposition:  HOME / SELF CARE / HOMELESS


Is pt being admited?: No


Does the pt Need Aspirin: No


Condition: Stable


Instructions:  Vaginal Bleeding During Pregnancy, First Trimester


Additional Instructions: 


Follow up with My ob/gyn in 48 hour to have repeat HCG or return to ED 


Stop having vigorous sex  until after evaluation with  OB/GYN 


Return to the ED for any worsening symptoms


Referrals: 


[facility] [Primary Care Provider] - 3-5 Days


[facility] [Provider Group] - 3-5 Days